# Patient Record
Sex: MALE | Race: WHITE | NOT HISPANIC OR LATINO
[De-identification: names, ages, dates, MRNs, and addresses within clinical notes are randomized per-mention and may not be internally consistent; named-entity substitution may affect disease eponyms.]

---

## 2024-11-01 ENCOUNTER — DASHBOARD ENCOUNTERS (OUTPATIENT)
Age: 69
End: 2024-11-01

## 2024-11-01 ENCOUNTER — OFFICE VISIT (OUTPATIENT)
Dept: URBAN - METROPOLITAN AREA CLINIC 113 | Facility: CLINIC | Age: 69
End: 2024-11-01
Payer: MEDICARE

## 2024-11-01 VITALS
HEART RATE: 69 BPM | DIASTOLIC BLOOD PRESSURE: 82 MMHG | TEMPERATURE: 97.8 F | SYSTOLIC BLOOD PRESSURE: 158 MMHG | BODY MASS INDEX: 38.51 KG/M2 | WEIGHT: 269 LBS | RESPIRATION RATE: 18 BRPM | HEIGHT: 70 IN

## 2024-11-01 DIAGNOSIS — R10.10 UPPER ABDOMINAL PAIN: ICD-10-CM

## 2024-11-01 DIAGNOSIS — Z86.0101 PERSONAL HISTORY OF ADENOMATOUS AND SERRATED COLON POLYPS: ICD-10-CM

## 2024-11-01 PROCEDURE — 99204 OFFICE O/P NEW MOD 45 MIN: CPT | Performed by: INTERNAL MEDICINE

## 2024-11-01 RX ORDER — PANTOPRAZOLE SODIUM 20 MG/1
1 TABLET TABLET, DELAYED RELEASE ORAL ONCE A DAY
OUTPATIENT

## 2024-11-01 RX ORDER — SODIUM, POTASSIUM,MAG SULFATES 17.5-3.13G
AS DIRECTED SOLUTION, RECONSTITUTED, ORAL ORAL ONCE
Qty: 1 | Refills: 0 | OUTPATIENT
Start: 2024-11-15 | End: 2024-11-16

## 2024-11-01 RX ORDER — CHOLECALCIFEROL (VITAMIN D3) 25 MCG
AS DIRECTED TABLET,CHEWABLE ORAL
Status: ACTIVE | COMMUNITY

## 2024-11-01 RX ORDER — LOSARTAN POTASSIUM 50 MG/1
1 TABLET TABLET, FILM COATED ORAL ONCE A DAY
Status: ACTIVE | COMMUNITY

## 2024-11-01 RX ORDER — LISINOPRIL 10 MG/1
1 TABLET TABLET ORAL ONCE A DAY
Status: ACTIVE | COMMUNITY

## 2024-11-01 RX ORDER — GABAPENTIN 100 MG/1
2 CAPSULE AT BEDTIME CAPSULE ORAL
Status: ACTIVE | COMMUNITY

## 2024-11-01 RX ORDER — PANTOPRAZOLE SODIUM 20 MG/1
1 TABLET TABLET, DELAYED RELEASE ORAL ONCE A DAY
Status: ACTIVE | COMMUNITY
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
## 2024-11-15 ENCOUNTER — CLAIMS CREATED FROM THE CLAIM WINDOW (OUTPATIENT)
Dept: URBAN - METROPOLITAN AREA SURGERY CENTER 25 | Facility: SURGERY CENTER | Age: 69
End: 2024-11-15
Payer: MEDICARE

## 2024-11-15 ENCOUNTER — CLAIMS CREATED FROM THE CLAIM WINDOW (OUTPATIENT)
Dept: URBAN - METROPOLITAN AREA CLINIC 4 | Facility: CLINIC | Age: 69
End: 2024-11-15
Payer: MEDICARE

## 2024-11-15 DIAGNOSIS — K21.9 GASTRO-ESOPHAGEAL REFLUX DISEASE WITHOUT ESOPHAGITIS: ICD-10-CM

## 2024-11-15 DIAGNOSIS — K22.89 OTHER SPECIFIED DISEASE OF ESOPHAGUS: ICD-10-CM

## 2024-11-15 DIAGNOSIS — K31.89 OTHER DISEASES OF STOMACH AND DUODENUM: ICD-10-CM

## 2024-11-15 DIAGNOSIS — T47.1X5A ADVERSE EFFECT OF PROTON PUMP INHIBITOR: ICD-10-CM

## 2024-11-15 PROCEDURE — 43239 EGD BIOPSY SINGLE/MULTIPLE: CPT | Performed by: CLINIC/CENTER

## 2024-11-15 PROCEDURE — 88305 TISSUE EXAM BY PATHOLOGIST: CPT | Performed by: PATHOLOGY

## 2024-11-15 PROCEDURE — 00731 ANES UPR GI NDSC PX NOS: CPT | Performed by: ANESTHESIOLOGY

## 2024-11-15 PROCEDURE — 43239 EGD BIOPSY SINGLE/MULTIPLE: CPT | Performed by: INTERNAL MEDICINE

## 2024-11-15 RX ORDER — CHOLECALCIFEROL (VITAMIN D3) 25 MCG
AS DIRECTED TABLET,CHEWABLE ORAL
Status: ACTIVE | COMMUNITY

## 2024-11-15 RX ORDER — PANTOPRAZOLE SODIUM 20 MG/1
1 TABLET TABLET, DELAYED RELEASE ORAL ONCE A DAY
Status: ACTIVE | COMMUNITY

## 2024-11-15 RX ORDER — GABAPENTIN 100 MG/1
2 CAPSULE AT BEDTIME CAPSULE ORAL
Status: ACTIVE | COMMUNITY

## 2024-11-15 RX ORDER — LOSARTAN POTASSIUM 50 MG/1
1 TABLET TABLET, FILM COATED ORAL ONCE A DAY
Status: ACTIVE | COMMUNITY

## 2024-11-15 RX ORDER — LISINOPRIL 10 MG/1
1 TABLET TABLET ORAL ONCE A DAY
Status: ACTIVE | COMMUNITY

## 2024-12-16 ENCOUNTER — OFFICE VISIT (OUTPATIENT)
Dept: URBAN - METROPOLITAN AREA CLINIC 113 | Facility: CLINIC | Age: 69
End: 2024-12-16
Payer: MEDICARE

## 2024-12-16 VITALS
DIASTOLIC BLOOD PRESSURE: 84 MMHG | WEIGHT: 270 LBS | SYSTOLIC BLOOD PRESSURE: 139 MMHG | HEIGHT: 70 IN | HEART RATE: 75 BPM | TEMPERATURE: 98.1 F | BODY MASS INDEX: 38.65 KG/M2 | RESPIRATION RATE: 18 BRPM

## 2024-12-16 DIAGNOSIS — R10.10 UPPER ABDOMINAL PAIN: ICD-10-CM

## 2024-12-16 DIAGNOSIS — Z86.0101 PERSONAL HISTORY OF ADENOMATOUS AND SERRATED COLON POLYPS: ICD-10-CM

## 2024-12-16 DIAGNOSIS — K21.9 GASTROESOPHAGEAL REFLUX DISEASE, UNSPECIFIED WHETHER ESOPHAGITIS PRESENT: ICD-10-CM

## 2024-12-16 PROBLEM — 235595009: Status: ACTIVE | Noted: 2024-12-16

## 2024-12-16 PROCEDURE — 99214 OFFICE O/P EST MOD 30 MIN: CPT

## 2024-12-16 RX ORDER — CHOLECALCIFEROL (VITAMIN D3) 25 MCG
AS DIRECTED TABLET,CHEWABLE ORAL
Status: ACTIVE | COMMUNITY

## 2024-12-16 RX ORDER — PANTOPRAZOLE SODIUM 40 MG/1
1 TABLET TABLET, DELAYED RELEASE ORAL ONCE A DAY
Qty: 90 TABLET | Refills: 3 | OUTPATIENT
Start: 2024-12-16

## 2024-12-16 RX ORDER — PANTOPRAZOLE SODIUM 20 MG/1
1 TABLET TABLET, DELAYED RELEASE ORAL ONCE A DAY
Status: ACTIVE | COMMUNITY

## 2024-12-16 RX ORDER — PANTOPRAZOLE SODIUM 20 MG/1
1 TABLET TABLET, DELAYED RELEASE ORAL ONCE A DAY
OUTPATIENT

## 2024-12-16 RX ORDER — GABAPENTIN 100 MG/1
2 CAPSULE AT BEDTIME CAPSULE ORAL
Status: ACTIVE | COMMUNITY

## 2024-12-16 RX ORDER — LOSARTAN POTASSIUM 50 MG/1
1 TABLET TABLET, FILM COATED ORAL ONCE A DAY
Status: ACTIVE | COMMUNITY

## 2024-12-16 NOTE — HPI-TODAY'S VISIT:
69-year-old gentleman presenting for follow-up after EGD.  He was last in office 11/1/2024 for evaluation regarding chronic issues of postprandial upper abdominal pain with chronic NSAID use.  He was to continue pantoprazole 40 mL daily for any peptic ulcer disease and an EGD was performed for further evaluation.  He reports having melena but suspect this is related to his Pepto-Bismol use.  Depending on the results of the EGD he may do a CT abdomen pelvis with contrast.  Her history of colonoscopy is also due for colon polyp surveillance.  This will be arranged as convenience after the EGD.  His EGD performed 11/15/2024 showed esophageal mucosal changes classified as Erickson's stage C 0-M1 per [place] criteria.  Biopsied.  Mild diffuse gastric erythema which biopsy to rule H. pylori.  Normal duodenum.  Pathology revealed predominant hyper effect.  No evidence of H. pylori or intestinal aplasia.  Esophageal biopsies his, pulmonary close with reflux type changes.  No evidence of Erickson's esophagus or eosinophilic esophagitis.  Today he reports he is doing better with Pantoprazole, he felt the omeprazole was ineffective. Since our last visit his PCP swithced him to Losartan due to side effect of cough with his lisinopril. He is working to make dietary modifications. His bowels are moving regularly without blood per rectum. He does still experience dark stool however this is when taking pepto bismol

## 2025-07-09 ENCOUNTER — OFFICE VISIT (OUTPATIENT)
Dept: URBAN - METROPOLITAN AREA CLINIC 113 | Facility: CLINIC | Age: 70
End: 2025-07-09
Payer: MEDICARE

## 2025-07-09 ENCOUNTER — LAB OUTSIDE AN ENCOUNTER (OUTPATIENT)
Dept: URBAN - METROPOLITAN AREA CLINIC 113 | Facility: CLINIC | Age: 70
End: 2025-07-09

## 2025-07-09 DIAGNOSIS — R14.0 ABDOMINAL BLOATING: ICD-10-CM

## 2025-07-09 DIAGNOSIS — R10.84 ABDOMINAL CRAMPING, GENERALIZED: ICD-10-CM

## 2025-07-09 DIAGNOSIS — Z87.19 HISTORY OF BARRETT'S ESOPHAGUS: ICD-10-CM

## 2025-07-09 DIAGNOSIS — Z86.0101 PERSONAL HISTORY OF ADENOMATOUS AND SERRATED COLON POLYPS: ICD-10-CM

## 2025-07-09 DIAGNOSIS — R60.0 LEG EDEMA: ICD-10-CM

## 2025-07-09 PROCEDURE — 99214 OFFICE O/P EST MOD 30 MIN: CPT

## 2025-07-09 RX ORDER — LOSARTAN POTASSIUM 50 MG/1
1 TABLET TABLET, FILM COATED ORAL ONCE A DAY
Status: ACTIVE | COMMUNITY

## 2025-07-09 RX ORDER — GABAPENTIN 300 MG/1
2 CAPSULE AT BEDTIME CAPSULE ORAL
Status: ACTIVE | COMMUNITY

## 2025-07-09 RX ORDER — CHOLECALCIFEROL (VITAMIN D3) 25 MCG
AS DIRECTED TABLET,CHEWABLE ORAL
Status: ACTIVE | COMMUNITY

## 2025-07-09 RX ORDER — PANTOPRAZOLE SODIUM 40 MG/1
1 TABLET TABLET, DELAYED RELEASE ORAL ONCE A DAY
Qty: 90 TABLET | Refills: 3 | Status: ACTIVE | COMMUNITY
Start: 2024-12-16

## 2025-07-09 NOTE — HPI-TODAY'S VISIT:
Mr. Vásquez is a 70-year-old gentleman with a history of adenomatous colon polyps due surveillance, hypertension, hyperlipidemia presenting for short interval follow-up for evaluation of severe abdominal bloating.  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  Pantoprazole to be increased to 40 mg p.o. daily.  Consider repeat EGD in 3 years.  Regarding his history of colon polyps he is due for surveillance colonoscopy which never scheduled for after his back surgery.  Today he reports that for some time he has been dealing with abdominal bloating and gas.  After his back surgery he felt things have worsened.  He has also been struggling with constipation.  He has used OTC stimulant laxatives which do help however he states the bowel movement is very large and painful to push out.  He denies medic easier melena.  Weight up since last visit.  He does have some lower extremity swelling.  He endorses nausea without vomiting.

## 2025-08-19 ENCOUNTER — OFFICE VISIT (OUTPATIENT)
Dept: URBAN - METROPOLITAN AREA CLINIC 113 | Facility: CLINIC | Age: 70
End: 2025-08-19

## 2025-08-27 ENCOUNTER — OFFICE VISIT (OUTPATIENT)
Dept: URBAN - METROPOLITAN AREA CLINIC 113 | Facility: CLINIC | Age: 70
End: 2025-08-27